# Patient Record
Sex: MALE | URBAN - METROPOLITAN AREA
[De-identification: names, ages, dates, MRNs, and addresses within clinical notes are randomized per-mention and may not be internally consistent; named-entity substitution may affect disease eponyms.]

---

## 2023-03-10 ENCOUNTER — TRANSCRIBE ORDERS (OUTPATIENT)
Dept: ADMINISTRATIVE | Age: 53
End: 2023-03-10

## 2023-03-10 DIAGNOSIS — M79.602 BILATERAL ARM PAIN: Primary | ICD-10-CM

## 2023-03-10 DIAGNOSIS — M79.601 BILATERAL ARM PAIN: Primary | ICD-10-CM

## 2024-05-21 ENCOUNTER — TELEPHONE (OUTPATIENT)
Dept: PULMONOLOGY | Age: 54
End: 2024-05-21

## 2024-05-21 NOTE — TELEPHONE ENCOUNTER
Patient cancelled appointment on 5/21/24 with Dr Combs for npt r/b Gracie, Mild emphysema     Reason: Provider not in office, ICU week, could not reach to r/s    Patient did not reschedule appointment.    Appointment rescheduled for N/A.     Last OV N/A

## 2024-08-05 ENCOUNTER — TELEPHONE (OUTPATIENT)
Dept: PULMONOLOGY | Age: 54
End: 2024-08-05

## 2024-08-05 NOTE — TELEPHONE ENCOUNTER
Patient did not show for NEW PT  with EVA DUEÑAS on 8/5/24.    Reason:  FORGOT    This is patient's first no show.  Patient was ano show on: NA.      Patient did not reschedule.  Reschedule date:  NA  Will call back in a few days and reschedule.